# Patient Record
Sex: MALE | Race: WHITE | NOT HISPANIC OR LATINO | Employment: OTHER | URBAN - METROPOLITAN AREA
[De-identification: names, ages, dates, MRNs, and addresses within clinical notes are randomized per-mention and may not be internally consistent; named-entity substitution may affect disease eponyms.]

---

## 2022-10-04 ENCOUNTER — OFFICE VISIT (OUTPATIENT)
Dept: NEUROSURGERY | Facility: CLINIC | Age: 47
End: 2022-10-04

## 2022-10-04 VITALS
WEIGHT: 315 LBS | DIASTOLIC BLOOD PRESSURE: 78 MMHG | OXYGEN SATURATION: 97 % | SYSTOLIC BLOOD PRESSURE: 129 MMHG | HEIGHT: 67 IN | HEART RATE: 72 BPM | BODY MASS INDEX: 49.44 KG/M2

## 2022-10-04 DIAGNOSIS — Z98.890 HX OF DECOMPRESSIVE LUMBAR LAMINECTOMY: ICD-10-CM

## 2022-10-04 DIAGNOSIS — M51.44 SCHMORL'S NODES OF THE THORACIC REGION: ICD-10-CM

## 2022-10-04 DIAGNOSIS — M47.814 THORACIC SPONDYLOSIS WITHOUT MYELOPATHY: Primary | ICD-10-CM

## 2022-10-04 DIAGNOSIS — M54.12 CERVICAL RADICULOPATHY: ICD-10-CM

## 2022-10-04 PROCEDURE — 99204 OFFICE O/P NEW MOD 45 MIN: CPT | Performed by: NURSE PRACTITIONER

## 2022-10-04 RX ORDER — FOLIC ACID 1 MG/1
1 TABLET ORAL DAILY
COMMUNITY
Start: 2022-09-29

## 2022-10-04 RX ORDER — HYDROXYCHLOROQUINE SULFATE 200 MG/1
200 TABLET, FILM COATED ORAL
COMMUNITY
Start: 2022-09-29

## 2022-10-04 RX ORDER — ALBUTEROL SULFATE 90 UG/1
AEROSOL, METERED RESPIRATORY (INHALATION)
COMMUNITY

## 2022-10-04 RX ORDER — HYDROCHLOROTHIAZIDE 25 MG/1
25 TABLET ORAL DAILY
COMMUNITY
Start: 2022-09-08

## 2022-10-04 RX ORDER — METHOTREXATE 25 MG/ML
INJECTION, SOLUTION INTRA-ARTERIAL; INTRAMUSCULAR; INTRAVENOUS
COMMUNITY
Start: 2022-09-30

## 2022-10-04 NOTE — PATIENT INSTRUCTIONS
-Referral to pain management for thoracic epidural injection  -MRI Cervical Spine  -Follow up in 6 weeks

## 2022-10-04 NOTE — PROGRESS NOTES
"Chief Complaint  Back Pain    Subjective          Remy French who is a 46 y.o. year old male who presents to [facility]ERY for evaluation of thoracic spine.      The patient complains of pain located in the cervical, thoracic  and lumbar spine.  Patients states the pain has been present for 2 years.  The pain came on gradually.  The pain scale level is 8.  The pain does radiate. Dermatomes are located bilaterally Cervical at: C6 and C7..  He will also have pain wrapping around in the left thoracic dermatome. Has pain into the right leg with standing and walking. The pain is constant and waxing/waning and described as sharp, aching, burning and tightness.  The pain is worse at no particular time of day. Patient states bending, twisting, lifting and raising the arms makes the pain worse.  Patient states rest makes the pain better.    Associated Symptoms Include: Numbness and Tingling  Conservative Interventions Include: Pain Medications that were not very effective. and NSAIDs that were not very effective. He is being followed by pain management, receives Tramadol which provides little relief. He has not had any recent physical therapy.     Was this the result of an injury or accident?: No    History of Previous Spinal Surgery?: Yes.  Lumbar, Date Laminectomy L2/3 2020 in [place].    Nicotine use: smoker  (0.25 ppd) He is working on quitting. Using nicotine patches.    BMI: Body mass index is 52 kg/m².      Review of Systems   Musculoskeletal: Positive for arthralgias, back pain, gait problem, myalgias, neck pain and neck stiffness.   Neurological: Positive for weakness and numbness.   All other systems reviewed and are negative.       Objective   Vital Signs:   /78   Pulse 72   Ht 170.2 cm (67\")   Wt (!) 151 kg (332 lb)   SpO2 97%   BMI 52.00 kg/m²       Physical Exam  Vitals reviewed.   Constitutional:       Appearance: Normal appearance. "   Musculoskeletal:      Right shoulder: No tenderness. Normal range of motion.      Left shoulder: No tenderness. Normal range of motion.      Cervical back: Tenderness present. Pain with movement present. Decreased range of motion.      Lumbar back: No tenderness. Negative right straight leg raise test and negative left straight leg raise test.      Right hip: No tenderness. Normal range of motion.      Left hip: No tenderness. Normal range of motion.   Neurological:      Mental Status: He is alert and oriented to person, place, and time.      Gait: Gait is intact.      Deep Tendon Reflexes: Strength normal.      Reflex Scores:       Tricep reflexes are 0 on the right side and 0 on the left side.       Bicep reflexes are 0 on the right side and 0 on the left side.       Brachioradialis reflexes are 0 on the right side and 0 on the left side.       Patellar reflexes are 0 on the right side and 0 on the left side.       Achilles reflexes are 0 on the right side and 0 on the left side.       Neurologic Exam     Mental Status   Oriented to person, place, and time.   Level of consciousness: alert    Motor Exam   Muscle bulk: normal  Overall muscle tone: normal    Strength   Strength 5/5 throughout.     Sensory Exam   Sensory deficit distribution on right: C7    Gait, Coordination, and Reflexes     Gait  Gait: normal    Reflexes   Right brachioradialis: 0  Left brachioradialis: 0  Right biceps: 0  Left biceps: 0  Right triceps: 0  Left triceps: 0  Right patellar: 0  Left patellar: 0  Right achilles: 0  Left achilles: 0  Right French: absent  Left French: absent  Right ankle clonus: absent  Left ankle clonus: absent       Result Review :       Data reviewed: Radiologic studies MRI Thoracic Spine at [facility] on 8/17/22 personally reviewed. Multilevel degenerative changes with Schmorl's node formation and mild chronic anterior wedging at T11, T7, T8, and T9. There is no evidence of spinal canal or foraminal  stenosis. No cord signal change.       MRI Lumbar Spine report 10/29/21  Degenerative changes are as follows:     L2-L3: Mild facet arthropathy. No spinal or neural foraminal stenosis.     L4-L5: Ligamentum flavum thickening and facet arthropathy. No spinal or neural foraminal stenosis.     L5-S1: Minimal disc bulge and facet arthropathy. No significant spinal or neural foraminal stenosis.     The conus demonstrates normal signal and contour. The conus terminates at the L2-L3 level.     Enhancing soft tissues in the epidural space, mostly posteriorly, at the levels of L2 and L3, are most compatible with postoperative scarring and granulation tissue.     At the level of the lower half of the L2 vertebral body, there is redemonstration of faint, amorphous enhancement within the thecal sac. This is seen in image #21 of series 9.     There is redemonstration of a thin, linear filar lipoma seen at the levels of the L4 inferior endplate to the sacrum.        Assessment and Plan    Diagnoses and all orders for this visit:    1. Thoracic spondylosis without myelopathy (Primary)  -     Ambulatory Referral to Pain Management    2. Schmorl's nodes of the thoracic region  -     Ambulatory Referral to Pain Management    3. Cervical radiculopathy  -     MRI Cervical Spine Without Contrast; Future    4. Hx of decompressive lumbar laminectomy    Pt presenting for evaluation of thoracic spine. We reviewed his MRI Thoracic Spine, demonstrating multilevel degenerative changes with Schmorl's nodes. We discussed that these findings are benign and there is no significant compression on the spinal cord which is reassuring. No surgical recommendations at this time. Will refer to pain management for thoracic epidural steroid injections.     He is having radiating pain into the arms in a C7 distribution with sensory deficit in the right hand. Will proceed with MRI Cervical Spine to evaluate for interventional management.     He has been  followed by  for his prior lumbar spine surgery. He would like for us to take over his care due to transportation difficulties.     He will follow up in 6 weeks.       Follow Up   Return in about 6 weeks (around 11/15/2022).  Patient was given instructions and counseling regarding his condition or for health maintenance advice.     -Referral to pain management for thoracic epidural injection  -MRI Cervical Spine  -Follow up in 6 weeks

## 2022-10-12 DIAGNOSIS — M54.12 CERVICAL RADICULOPATHY: ICD-10-CM

## 2022-11-11 ENCOUNTER — TRANSCRIBE ORDERS (OUTPATIENT)
Dept: ADMINISTRATIVE | Facility: HOSPITAL | Age: 47
End: 2022-11-11

## 2022-11-11 DIAGNOSIS — R06.09 DYSPNEA ON EXERTION: Primary | ICD-10-CM

## 2022-11-15 ENCOUNTER — OFFICE VISIT (OUTPATIENT)
Dept: NEUROSURGERY | Facility: CLINIC | Age: 47
End: 2022-11-15

## 2022-11-15 VITALS
HEART RATE: 76 BPM | BODY MASS INDEX: 49.44 KG/M2 | DIASTOLIC BLOOD PRESSURE: 65 MMHG | SYSTOLIC BLOOD PRESSURE: 152 MMHG | HEIGHT: 67 IN | WEIGHT: 315 LBS

## 2022-11-15 DIAGNOSIS — R20.2 PARESTHESIAS: ICD-10-CM

## 2022-11-15 DIAGNOSIS — M47.814 THORACIC SPONDYLOSIS WITHOUT MYELOPATHY: ICD-10-CM

## 2022-11-15 DIAGNOSIS — M47.812 CERVICAL SPONDYLOSIS WITHOUT MYELOPATHY: Primary | ICD-10-CM

## 2022-11-15 PROCEDURE — 99214 OFFICE O/P EST MOD 30 MIN: CPT | Performed by: NURSE PRACTITIONER

## 2022-11-15 NOTE — PROGRESS NOTES
Chief Complaint  Follow-up    Subjective          Remy French who is a 46 y.o. year old male who presents to [facility] NEUROLOGY [facility] NEUROSURGERY for follow up. MRI cervical spine.     History of Present Illness  MRI Cervical Spine at [facility] on 10/8/22 personally reviewed. Multilevel degenerative changes, most significant at C3/4, without significant canal or foraminal stenosis.      He has concerns of intermittent numbness and tingling into the hands. This occurs with prolonged use of the hands, gripping objects. He will shake his hands and paresthesias will resolve.     He continues to be followed by pain management with [facility]. He has not had thoracic epidural since his last visit. The primary focus has been on his low back. He reports recently having a nerve study of the lower extremities.      Interval History      Remy French who is a 46 y.o. year old male who presents to [facility] NEUROLOGY [facility] NEUROSURGERY for evaluation of thoracic spine.        The patient complains of pain located in the cervical, thoracic  and lumbar spine.  Patients states the pain has been present for 2 years.  The pain came on gradually.  The pain scale level is 8.  The pain does radiate. Dermatomes are located bilaterally Cervical at: C6 and C7..  He will also have pain wrapping around in the left thoracic dermatome. Has pain into the right leg with standing and walking. The pain is constant and waxing/waning and described as sharp, aching, burning and tightness.  The pain is worse at no particular time of day. Patient states bending, twisting, lifting and raising the arms makes the pain worse.  Patient states rest makes the pain better.     Associated Symptoms Include: Numbness and Tingling  Conservative Interventions Include: Pain Medications that were not very effective. and NSAIDs that were not very effective. He is being followed by pain management,  "receives Tramadol which provides little relief. He has not had any recent physical therapy.      Was this the result of an injury or accident?: No     History of Previous Spinal Surgery?: Yes.  Lumbar, Date Laminectomy L2/3 2020 in [place].     Nicotine use: smoker  (0.25 ppd) He is working on quitting. Using nicotine patches.     BMI: Body mass index is 52 kg/m².     Recent Interventions: See above      Review of Systems   Musculoskeletal: Positive for arthralgias, back pain, myalgias, neck pain and neck stiffness.   Neurological: Positive for numbness.   All other systems reviewed and are negative.       Objective   Vital Signs:   /65   Pulse 76   Ht 170.2 cm (67\")   Wt (!) 155 kg (341 lb)   BMI 53.41 kg/m²       Physical Exam  Vitals reviewed.   Constitutional:       Appearance: Normal appearance.   Neurological:      Mental Status: He is alert and oriented to person, place, and time.      Motor: Motor strength is normal.      Gait: Gait is intact.        Neurologic Exam     Mental Status   Oriented to person, place, and time.   Level of consciousness: alert    Motor Exam   Muscle bulk: normal  Overall muscle tone: normal    Strength   Strength 5/5 throughout.     Gait, Coordination, and Reflexes     Gait  Gait: normal       Result Review :       Data reviewed: Radiologic studies MRI Cervical Spine at [facility] on 10/8/22 personally reviewed. Multilevel degenerative changes, most significant at C3/4, without significant canal or foraminal stenosis.           Assessment and Plan    Diagnoses and all orders for this visit:    1. Cervical spondylosis without myelopathy (Primary)    2. Paresthesias    3. Thoracic spondylosis without myelopathy    We reviewed his MRI Cervical Spine, demonstrating multilevel spondylosis without significant canal or foraminal stenosis. We discussed that this would not explain the paresthesias in his hands. Suspect a possible carpal tunnel or cubital tunnel. Could " consider nerve study for further evaluation. He recently had a nerve study on his lower extremities. He declines further evaluation at this time.     Regarding the thoracic spine, pain symptoms persist. Will touch base with pain management regarding TESI.     He will call should symptoms worsen or progress.       Follow Up   Return if symptoms worsen or fail to improve.  Patient was given instructions and counseling regarding his condition or for health maintenance advice.

## 2022-11-30 ENCOUNTER — APPOINTMENT (OUTPATIENT)
Dept: CT IMAGING | Facility: HOSPITAL | Age: 47
End: 2022-11-30